# Patient Record
Sex: FEMALE | Race: WHITE | Employment: FULL TIME | URBAN - METROPOLITAN AREA
[De-identification: names, ages, dates, MRNs, and addresses within clinical notes are randomized per-mention and may not be internally consistent; named-entity substitution may affect disease eponyms.]

---

## 2017-08-15 ENCOUNTER — HOSPITAL ENCOUNTER (OUTPATIENT)
Dept: MAMMOGRAPHY | Age: 57
Discharge: HOME | End: 2017-08-15
Attending: FAMILY MEDICINE | Admitting: FAMILY MEDICINE

## 2017-08-15 DIAGNOSIS — Z12.31 VISIT FOR SCREENING MAMMOGRAM: ICD-10-CM

## 2018-08-17 ENCOUNTER — HOSPITAL ENCOUNTER (OUTPATIENT)
Dept: MAMMOGRAPHY | Age: 58
Discharge: HOME | End: 2018-08-22
Payer: COMMERCIAL

## 2018-08-17 DIAGNOSIS — Z12.31 VISIT FOR SCREENING MAMMOGRAM: ICD-10-CM

## 2018-08-17 PROCEDURE — 77067 SCR MAMMO BI INCL CAD: CPT

## 2019-08-21 ENCOUNTER — HOSPITAL ENCOUNTER (OUTPATIENT)
Dept: MAMMOGRAPHY | Age: 59
Discharge: HOME | End: 2019-08-26
Payer: COMMERCIAL

## 2019-08-21 DIAGNOSIS — Z12.31 VISIT FOR SCREENING MAMMOGRAM: ICD-10-CM

## 2019-09-11 ENCOUNTER — HOSPITAL ENCOUNTER (OUTPATIENT)
Dept: MAMMOGRAPHY | Age: 59
Discharge: HOME | End: 2019-09-16
Payer: COMMERCIAL

## 2019-09-11 DIAGNOSIS — Z12.31 VISIT FOR SCREENING MAMMOGRAM: ICD-10-CM

## 2019-09-11 PROCEDURE — 77067 SCR MAMMO BI INCL CAD: CPT

## 2020-09-24 ENCOUNTER — HOSPITAL ENCOUNTER (OUTPATIENT)
Dept: MAMMOGRAPHY | Age: 60
Discharge: HOME | End: 2020-09-29
Payer: COMMERCIAL

## 2020-09-24 PROCEDURE — 77063 BREAST TOMOSYNTHESIS BI: CPT

## 2021-11-20 NOTE — PROGRESS NOTES
[facility]  Pulmonary medicine clinic initial consult note. Patient: Tommy Freire  : 1960      Chief complaint/[place]: Tommy Freire is a 64 y.o. old female came for further evaluation regarding her COPD and shortness of breath with referral from Dr. Yocasta York MD     She is having shortness of breath:Yes  Onset: Gradual  Duration:{NUMBERS 0-12:07838}{DURATION:}. Diurnal variation:  None. Worse {Time; morning/afternoon/evenin::\"in the morning\"}  Functional status prior to beginning of symptoms: Distance she can walk on level ground: {NUMBERS; 100-1000 :41581} feet. Current functional capacity on level ground: Distance she can walk on level ground: {NUMBERS; 100-1000 :22897} feet. She can climb steps: {YES/NO:::\"No\"}  Flights of steps she can climb: {NUMBERS; 0-10:5044}  She gives a history of orthopnea:{YES/NO:::\"Yes\"}  She gives a history of paroxysmal nocturnal dyspnea:{YES/NO:::\"Yes\"}       She is having cough: {YES/NO:::\"Yes\"}  Duration of cough: for {NUMBERS 1-30:570050257} days. Her cough is associated with sputum production: {YES/NO:::\"Yes\"}   The sputum color: {COLOR:3322657963::\"clear\"}  Hemoptysis:{YES/NO:::\"No\"}  Diurnal variation: None. Worse {Time; morning/afternoon/evenin::\"in the morning\"}  Relieving factors: {YES/NO:::\"Yes\"}  Aggravating factors: {YES/NO:::\"No\"}    She is having chest pain:{YES/NO:::\"No\"}  Duration:Days:{NUMBERS 0-12:}  Onset:{DESC; ONSET:8}. Location:predominantly on {LOCATION:3441237754}  Nature/Quality:{PAIN QUALITY:}  Assessment:{PAIN ASSESSMENT:09500}. Radiation:{Pain radiation-gi:44680}  Scale:{pain scale:903739893}/10  Relation ship to breathing: {Increased/decreased/unchanged:54825} with inspiration.     She is currently using any oxygen supplementation at rest, exercise or during sleep/at night time: {YES/NO:19726::\"No\"}    She was ever diagnosed with following pulmonary diseases:  Asthma:NO  Pulmonary fibrosis:NO  Sarcoidosis:NO  Pulmonary embolism: NO   History of DVT in the past: NO    Pulmonary hypertension:NO  Pleural effusion/s in the past:NO    She ever diagnosed with connective tissue diseases including Systemic lupus Erythematosus, Rheumatoid arthritis etc:NO    She ever diagnosed with pulmonary tuberculosis in the past:NO  She ever recently exposed to patients with tuberculosis:NO  She ever recently travelled to endemic places of tuberculosis or out side [place]:NO  Her ever tested for PPD in the past: NO    She ever diagnosed with COVID-19 infection in the past:{YES/NO:19726}. Social History:  Occupation:  She is current working: {YES/NO:19726}  Type of profession: {Occupation:51801}. Social History     Tobacco Use    Smoking status: Not on file    Smokeless tobacco: Not on file   Substance Use Topics    Alcohol use: Not on file    Drug use: Not on file       History of recreational or IV drug use in the past:NO  History of Alcohol use: {YES/NO:19726}. History of exposure to coal mines/coal dust: NO  History of exposure to foundry dust/welding: NO  History of exposure to quarry/silica/sandblasting: NO  History of exposure to asbestos/working with breaks/ships: NO  History of exposure to farm dust: NO  History of recent travel to long distances: NO  History of exposure to birds, pigeons, or chickens in the past:NO  Pet animals at home:{YES/NO:19726}  Dogs: {NUMBERS 0-10:20300}  Cats: {NUMBERS 0-10:20300}    History of pulmonary embolism in the past: {YES/NO:19726}            History of DVT in the past:{YES/NO:19726}                             Review of Systems:   General/Constitutional: No recent loss of weight or appetite changes. No fever or chills. HENT: Negative. Eyes: Negative. Upper respiratory tract: No nasal stuffiness or post nasal drip.   Lower respiratory tract/ lungs: No cough or sputum production. No hemoptysis. Cardiovascular: No palpitations or chest pain. Gastrointestinal: No nausea or vomiting. Neurological: No focal neurologiacal weakness. Extremities: No edema. Musculoskeletal: No complaints. Genitourinary: No complaints. Hematological: Negative. Psychiatric/Behavioral: Negative. Skin: No itching. Current Medications:          No past medical history on file. No past surgical history on file. Not on File    No current outpatient medications on file. No current facility-administered medications for this visit. No family history on file. Physical Exam:    VITALS:  There were no vitals taken for this visit. Nursing note and vitals reviewed. Constitutional: Patient appears moderately built and moderately nourished. No distress. Patient is oriented to person, place, and time. HENT:   Head: Normocephalic and atraumatic. Right Ear: External ear normal.   Left Ear: External ear normal.   Mouth/Throat: Oropharynx is clear and moist.  No oral thrush. Eyes: Conjunctivae are normal. Pupils are equal, round, and reactive to light. No scleral icterus. Neck: Neck supple. No JVD present. No tracheal deviation present. Cardiovascular: Normal rate, regular rhythm, normal heart sounds. No murmur heard. Pulmonary/Chest: Effort normal and breath sounds normal. No stridor. No respiratory distress. No wheezes. No rales. Patient exhibits no tenderness. Abdominal: Soft. Patient exhibits no distension. No tenderness. Musculoskeletal: Normal range of motion. Extremities: Patient exhibits no edema and no tenderness. Lymphadenopathy:  No cervical adenopathy. Neurological: Patient is alert and oriented to person, place, and time. Skin: Skin is warm and dry. Patient is not diaphoretic. Psychiatric: Patient  has a normal mood and affect.  Patient behavior is normal.     Mallampati airway Class:{Doug # I-V:19725}  Neck Circumference:{NUMBERS 0-31:38487}. {NUMBERS; 6-0:65270} Inches    Diagnostic Data:    Radiological Data:      Pulmonary function tests: Performed on 4 September 2018                  Assessment:  -Mild/Moderate/Severe COPD. No past medical history on file. Recommendations/Plan:    - Will send Orbs-3-Dokzavkedsl swab today and to be followed at next clinic visit. - She was educated and demonstrated in my office how to use inhalers.   -José Medrano advised to continue prescribed inhalers and keep good compliance. - Patient educated to update her pneumococcal vaccine with family physician and take influenza vaccine in coming season with out fail.   - Schedule patient for Pulmonary rehab consult as soon as possible for pulmonary rehab therapy for her COPD.  - José Medrano educated about my impression and plan. She verbalizes understanding.   - Schedule patient for follow up with my clinic in 3 to 4months. She was advised to make early appointment if needed.  -She refused to go for low dose CT chest to screen for lung cancer at this time. She verbalizes understanding of consequences of her decision including delay in diagnosis leading to death.      -I personally reviewed updated the Past medical hx, Past surgical hx,Social hx, Family hx, Medications, Allergies in the discrete data section of the patient chart along with labs, Pulmonary medicine,Sleep medicine related, Pathological, Microbiological and Radiological investigations.

## 2021-12-17 ENCOUNTER — HOSPITAL ENCOUNTER (OUTPATIENT)
Dept: GENERAL RADIOLOGY | Age: 61
Discharge: HOME | End: 2021-12-17

## 2021-12-17 DIAGNOSIS — Z00.6 EXAMINATION FOR NORMAL COMPARISON FOR CLINICAL RESEARCH: ICD-10-CM

## 2021-12-20 ENCOUNTER — OFFICE VISIT (OUTPATIENT)
Dept: PULMONOLOGY | Age: 61
End: 2021-12-20
Payer: COMMERCIAL

## 2021-12-20 ENCOUNTER — TELEPHONE (OUTPATIENT)
Dept: PULMONOLOGY | Age: 61
End: 2021-12-20

## 2021-12-20 VITALS
DIASTOLIC BLOOD PRESSURE: 64 MMHG | OXYGEN SATURATION: 87 % | TEMPERATURE: 98.1 F | WEIGHT: 151.6 LBS | SYSTOLIC BLOOD PRESSURE: 106 MMHG | HEIGHT: 66 IN | BODY MASS INDEX: 24.36 KG/M2 | HEART RATE: 58 BPM

## 2021-12-20 DIAGNOSIS — J44.9 COPD WITHOUT EXACERBATION (HCC): Primary | ICD-10-CM

## 2021-12-20 DIAGNOSIS — J41.0 SIMPLE CHRONIC BRONCHITIS (HCC): ICD-10-CM

## 2021-12-20 DIAGNOSIS — J44.9 COPD, SEVERE (HCC): ICD-10-CM

## 2021-12-20 DIAGNOSIS — F17.210 SMOKING GREATER THAN 40 PACK YEARS: ICD-10-CM

## 2021-12-20 DIAGNOSIS — Z87.891 PERSONAL HISTORY OF TOBACCO USE, PRESENTING HAZARDS TO HEALTH: ICD-10-CM

## 2021-12-20 DIAGNOSIS — J44.9 COPD, SEVERE (HCC): Primary | ICD-10-CM

## 2021-12-20 PROCEDURE — 99204 OFFICE O/P NEW MOD 45 MIN: CPT | Performed by: INTERNAL MEDICINE

## 2021-12-20 RX ORDER — ALBUTEROL SULFATE 90 UG/1
2 AEROSOL, METERED RESPIRATORY (INHALATION) EVERY 6 HOURS PRN
Qty: 18 G | Refills: 11 | Status: SHIPPED | OUTPATIENT
Start: 2021-12-20 | End: 2022-12-20

## 2021-12-20 NOTE — PROGRESS NOTES
Neck Circumference -   13.5  Mallampati - 4    Lung Nodule Screening     [] Qualifies    [] Does not qualify   [] Declined    [] Completed

## 2021-12-20 NOTE — PROGRESS NOTES
[facility]  Pulmonary medicine clinic initial consult note. Patient: Joshua Samaniego  : 1960      Chief complaint/[place] [facility]: Joshua Samaniego is a 64 y. o.oldfemale came for further evaluation regarding her with referral from Dr. Chandrika Rodriguez MD. H/o COPD GOLD 3 FEV1 40ppv in 2018, only on albuterol rescue. No other significant past medical history in chart or per patient. Pt uses her albuterol rescue every morning and night and sometimes at work after walking at work. 2L NC intermittently but not necessarily with exertion. Pt has chronic cough which is productive of intermittently yellow phlegm (1 teaspoon). Intermittently feverish with chills. 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 History of tobacco smoking:Yes. Amount of tobacco smokin.0 PPD. Years of tobacco smokin. Quit smoking: Yes. .              Quit year:   Current smoker: No.       Amount of current tobacco smokin PPD    History of recreational or IV drug use in the past:NO     History of exposure to coal mines/coal dust: NO  History of exposure to foundry dust/welding: NO  History of exposure to quarry/silica/sandblasting: NO  History of exposure to asbestos/working with breaks/ships: NO  History of exposure to farm dust: NO  History of recent travel to long distances: NO  History of exposure to birds, pigeons, or chickens in the past:NO  Pet animals at home:Yes  Dogs: 2  Cats: 0    History of pulmonary embolism in the past: No            History of DVT in the past:No                             Review of Systems: negative except for the aforementioned    Current Medications:          No past medical history on file. No past surgical history on file.     No Known Allergies    Current Outpatient Medications   Medication Sig Dispense Refill    UNABLE TO FIND Patient states she is on something for depression but can't remember the name.  ALBUTEROL IN Inhale into the lungs       No current facility-administered medications for this visit. No family history on file. Physical Exam:    VITALS:  /64 (Site: Left Upper Arm, Position: Sitting, Cuff Size: Medium Adult)   Pulse 58   Temp 98.1 °F (36.7 °C)   Ht 5' 6\" (1.676 m)   Wt 151 lb 9.6 oz (68.8 kg)   SpO2 (!) 87% Comment: room air at rest  BMI 24.47 kg/m²     General/Constitutional: no acute distress. Was anxious as she is claustrophobic and patient room is small. HEENT: NCAT. PERRL, EOMI. Clear oropharyngeal cavity w/o erythema or exudate  Upper respiratory tract: no upper airway referred noises, stridor, or masses noted on palpation of neck. Lower respiratory tract/ lungs: Significant polyphonic wheezes heard over bilateral lung fields  Cardiovascular: RRR. No M/R/G. S1 and S2 normal. No LE edema  Gastrointestinal: soft, non-tender, and bowel sounds normoactive. Neurological: AOx4. No FND. MSK/Extremities: ambulating w/o difficulty. Can move upper extremities without difficulty. Hematological: No bruises noted over visible areas of body  Psychiatric/Behavioral: judgement and insight are intact  Skin: warm, dry, intact, no lesions, no erythema, no exudate    Mallampati airway Class:IV  Neck Circumference:13.5 Inches    Diagnostic Data:    Radiological Data:            07/06/2021    No impression from radiologist. Pt has multiple nodules located bilaterally primarily over the middle and lower lobes. Hardware noted. Chest Xray done in 2018            Pulmonary function tests: 2018        Assessment:  -COPD GOLD 3 FEV1 40ppv 09/04/2018  Trigger: cold air. Initial sxs: increased dyspnea, cough, wheezing. Exacerbation Hx: >/=2x. Smoker no, quit in 2019, 40pyh. A1AT: Never screened. Pneumonia vaccine: yes - per patient.  Home meds: Albuterol MDI: yes but temporary. Baseline O2 2L NC.  -Chronic Simple Bronchitis  -40 pack year history of smoking, quit in 2019    Recommendations/Plan:  - Will send Dpig-6-Hivwwuyjzes swab today and to be followed at next clinic visit. - Will prescribe 1-year supply of Stiolto maintenance inhaler; 1-year supply of albuterol inhaler already sent by primary care physician  - Will schedule LD CT Chest for recommended screening given pt's smoking history; patient was agreeable to this. - She was educated and demonstrated in my office how to use inhalers.   -Smith Janette advised to continue prescribed inhalers and keep good compliance. - Patient educated to update her pneumococcal vaccine with family physician and take influenza vaccine in coming season with out fail.   - Schedule patient for Pulmonary rehab consult as soon as possible for pulmonary rehab therapy for her COPD.  - Luis Dawsonsteve educated about my impression and plan. She verbalizes understanding.   - Schedule patient for follow up with Dr. Mayra Ascencio MD in 1 month for review of LD CT Chest Scan. She was advised to make early appointment if needed. Addendum by Dr. Mayra Ascencio MD:  I have seen and examined the patient independently. Face to face evaluation and examination was performed. The above evaluation and note has been reviewed. Labs and radiographs were reviewed. I have discussed with Dr. Vishnu Elise MD about this patient in detail. The above assessment and plan has been reviewed. Please see my modifications mentioned below. My modifications:  Pulmonary function tests: Performed on 4 September 2018              She denies any history of Glucoma or urinary retention in the past    Assessment:  -Severe COPD- not under control.  -Chronic use of tobacco smoking.   She quit smoking  -Abnormal chest x-ray with multiple subcentimeter lung nodules stable as per the last chest x-ray.  -Chronic hypoxic respiratory failure on LT OT    Recommendations/Plan:  -Start patient on Stiolto Respimat ( Tiotropium bromide and Olodaterol) 2.5mcg/2.5mcg per actuation 2 puffs once daily. She was detailed about mechanism of action of drug along with associated side effects. She agreed to take the risk and medication. She verbalizes understanding. Patient educated and demonstrated in my office how to use above inhaler. Patient verbalizes understanding.  -Continue patient on Albuterol HFA 90mcg/Spray MDI, 2puffs  Q6Hprn. She  was informed about adverse effects of Albuterol HFA. She verbalizes understanding.  - Will send Gzic-1-Dpzzacbproz swab today and to be followed at next clinic visit. - She was educated and demonstrated in my office how to use inhalers.   -Maciel Cisneros advised to continue prescribed inhalers and keep good compliance. - Patient educated to update her pneumococcal vaccine with family physician and take influenza vaccine in coming season with out fail.   -Schedule patient for Pulmonary rehab consult as soon as possible for pulmonary rehab therapy for her COPD. -Schedule patient for low dose CT scan of chest with out IV contrast as recommended by the  U.S. Preventive Services Task Force and the NCCN for lung Cancer Screening as soon as possible.   -Maciel Cisneros was advised and instructed to come for follow up with my clinic in 1month I.e 1 to 2 weeks after having his low dose CT chest to go over the above test results and management. Giorgi Nguyen and her  were educated about my impression and plan. They verbalizes understanding.       -I personally reviewed updated the Past medical hx, Past surgical hx,Social hx, Family hx, Medications, Allergies in the discrete data section of the patient chart along with labs, Pulmonary medicine,Sleep medicine related, Pathological, Microbiological and Radiological investigations.        Nic Olmstead MD 12/20/2021 1:24 PM

## 2021-12-20 NOTE — PATIENT INSTRUCTIONS
Recommendations/Plan:  -Start patient on Stiolto Respimat ( Tiotropium bromide and Olodaterol) 2.5mcg/2.5mcg per actuation 2 puffs once daily. She was detailed about mechanism of action of drug along with associated side effects. She agreed to take the risk and medication. She verbalizes understanding. Patient educated and demonstrated in my office how to use above inhaler. Patient verbalizes understanding.  -Continue patient on Albuterol HFA 90mcg/Spray MDI, 2puffs  Q6Hprn. She  was informed about adverse effects of Albuterol HFA. She verbalizes understanding.  - Will send Vfub-1-Nwatjnrkncz swab today and to be followed at next clinic visit. - She was educated and demonstrated in my office how to use inhalers.   -Gio Marroquin advised to continue prescribed inhalers and keep good compliance. - Patient educated to update her pneumococcal vaccine with family physician and take influenza vaccine in coming season with out fail.   -Schedule patient for Pulmonary rehab consult as soon as possible for pulmonary rehab therapy for her COPD. -Schedule patient for low dose CT scan of chest with out IV contrast as recommended by the  U.S. Preventive Services Task Force and the NCCN for lung Cancer Screening as soon as possible.   -Gio Fideljoseph was advised and instructed to come for follow up with my clinic in 1month I.e 1 to 2 weeks after having his low dose CT chest to go over the above test results and management. Jose Laureano and her  were educated about my impression and plan. They verbalizes understanding.

## 2021-12-21 DIAGNOSIS — J44.9 COPD, SEVERE (HCC): ICD-10-CM

## 2022-01-01 NOTE — PROGRESS NOTES
[facility]  Pulmonary medicine clinic follow up note. Patient: Carlton Gross  : 1960      Chief complaint/[place]: Carlton Gross is a 64 y. o.oldfemale came for follow-up regarding her severe COPD after having recommended a low-dose CT scan of chest without IV contrast.     She was recommended to have a pulmonary rehab therapy for her severe COPD. Patient however not able to add in her pulmonary rehab therapy. Patient told me that she still working full-time and do not have time to attend her pulmonary rehab therapy. She was prescribed with 2 L of oxygen by her family physician for her COPD approximately 3 to 4 months back. Patient currently not using her home oxygen as prescribed. She want to be evaluated for home oxygen therapy to consider for discontinuation of home oxygen therapy. She was initially referred by Dr. Sidney Rodriguez MD.     On today's questioning:  She denies cough or expectoration. She denies hemoptysis. She denies fever or chills. She denies recent hospitalizations or emergency room visits. She is using her prescribed inhalers with good compliance. She is using rescue inhaler/nebs rarely. She denies recent loss of weight or appetite changes. She denies recent decline in functional status. She is currently on following inhaler therapy:  -Stiolto Respimat ( Tiotropium bromide and Olodaterol) 2.5mcg/2.5mcg per actuation 2 puffs once daily.  -Albuterol HFA 90mcg/Spray MDI, 2puffs  Q6Hprn. 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 History of tobacco smoking:Yes. Amount of tobacco smokin.0 PPD. Years of tobacco smokin. Quit smoking: Yes.   .              Quit year:   Current smoker: No.       Amount of current tobacco smokin PPD    History of recreational or IV drug use in the past:NO     History of exposure to coal mines/coal dust: NO  History of exposure to foundry dust/welding: NO  History of exposure to quarry/silica/sandblasting: NO  History of exposure to asbestos/working with breaks/ships: NO  History of exposure to farm dust: NO  History of recent travel to long distances: NO  History of exposure to birds, pigeons, or chickens in the past:NO  Pet animals at home:Yes  Dogs: 2  Cats: 0    History of pulmonary embolism in the past: No            History of DVT in the past:No                             Review of Systems:   General/Constitutional: she gained 1lbs of weight from the last visit. No fever or chills. HENT: Negative. Eyes: Negative. Upper respiratory tract: No nasal stuffiness or post nasal drip. Lower respiratory tract/ lungs: No cough or sputum production. Cardiovascular: No palpitations or chest pain. Gastrointestinal: No nausea or vomiting. Neurological: No focal neurologiacal weakness. Extremities: No edema. Musculoskeletal: No complaints. Genitourinary: No complaints. Hematological: Negative. Psychiatric/Behavioral: Negative. Skin: No itching. Current Medications:        No past medical history on file. No past surgical history on file. No Known Allergies    Current Outpatient Medications   Medication Sig Dispense Refill    UNABLE TO FIND Patient states she is on something for depression but can't remember the name.  tiotropium-olodaterol (STIOLTO RESPIMAT) 2.5-2.5 MCG/ACT AERS Inhale 2 puffs into the lungs daily 1 each 11    albuterol sulfate  (90 Base) MCG/ACT inhaler Inhale 2 puffs into the lungs every 6 hours as needed for Wheezing or Shortness of Breath 18 g 11     No current facility-administered medications for this visit. No family history on file.         Physical Exam:    VITALS:  /70 (Site: Left Upper Arm, Position: Sitting, Cuff Size: Medium me.      Zddk-5-TjppOxwuaem result:       Not done. Six Minute Walk Test done on 1/27/22 at 8:54 AM EST    Shaniqua Varghese 1960    She needs no home O2 at rest. She needs 3 LPM of home O2 with exercise. She will be evaluated for nocturnal home O2 requirement- see separte order. Please see scanned six minute walk test document in media for details with above date. DME Medical Necessity Documentation    Patient was seen in my clinic on 1/27/22 for the diagnosis COPD. I am prescribing oxygen because the diagnosis and testing requires the patient to have oxygen in the home. Condition will improve or be benefited by oxygen use. The patient is  able to perform good mobility in a home setting and therefore does require the use of a portable oxygen system. Assessment:  -Severe COPD- under control with the current inhaler therapy  -Chronic use of tobacco smoking. She quit smoking 2019  -Multiple calcified subcentimeter lung nodules-most likely due to previous granulomatous lung disease. \  -6 mm right lower lobe superior segment nodule noted on her CT scan of chest dated 20 January 2022. Need follow-up as per the radiologist recommendation.  -Chronic hypoxic respiratory failure on LT OT  -History of scoliosis. Patient underwent surgery with subsequent metal rods placement in 1981.     Recommendations/Plan:  -Continue Stiolto Respimat ( Tiotropium bromide and Olodaterol) 2.5mcg/2.5mcg per actuation 2 puffs once daily.  -Continue patient on Albuterol HFA 90mcg/Spray MDI, 2puffs  Q6Hprn.   -Will resend Kxpr-3-Azuoisgybdx swab today and to be followed at next clinic visit.   -She was educated and demonstrated in my office how to use inhalers.   -Shaniqua Varghese advised to continue prescribed inhalers and keep good compliance.  -Patient educated to update her pneumococcal vaccine with family physician and take influenza vaccine in coming season with out fail.   -Shaniqua Varghese needs no home O2 at rest. She needs 3LPM of home O2 with exercise. She will be evaluated for nocturnal home O2 requirement- see separte order.  -Nocturnal pulse ox study on room air to check for the continuation/discontinuation of patient current home O2 at night time.  -Schedule patient for CT scan of chest with out IV contrast in 6months to follow her 6 mm right lower lobe superior segment nodule noted on her low-dose CT scan of chest dated 20 January 2022.  -Dave Townsend was advised to make early appointment with my clinic if she develops any constitutional symptoms including loss of weight, poor appetite or hemoptysis. She verbalizes under standing.  - Schedule patient for follow up with my clinic in 6 months with recommended test I.e CT chest with out IV contrast. Patient advised to make early appointment if needed. Kristin Regan and her  were educated about my impression and plan. They verbalizes understanding.     -She was offered a Pulmonary rehab consult for pulmonary rehab therapy for her  COPD. How ever at the end of discussion she refused and verbalizes understanding of consequences of her decision. She also told me that she works 10 hours/day every day. She do not have any time to attend pulmonary rehab therapy  -I personally reviewed updated the Past medical hx, Past surgical hx,Social hx, Family hx, Medications, Allergies in the discrete data section of the patient chart along with labs, Pulmonary medicine,Sleep medicine related, Pathological, Microbiological and Radiological investigations. Addendum done on 2/1/22 at 3:43 PM EST:          Plan:  -Dave Townsend needs no home O2 at rest. She needs 3LPM of home O2 with exercise and 2LPM of home O2 at night time.

## 2022-01-20 ENCOUNTER — TELEPHONE (OUTPATIENT)
Dept: PULMONOLOGY | Age: 62
End: 2022-01-20

## 2022-01-20 NOTE — TELEPHONE ENCOUNTER
Received call from varun HAN [facility] dept, asking about CPT code for pt CT Lung screening she is says that the right code for the screening is [id number], code on order is [phone], she reported that this code is for CT Chest. Please advise.

## 2022-01-20 NOTE — TELEPHONE ENCOUNTER
Please request her to change to new CPT code if she can. I placed a new request in Epic dated today. I contacted CT scan dept at [facility]. The image code for low dose CT is [id number]. I also wrote an order for low dose CT chest with out IV contrast on a prescription. Please fax to the [facility] dept. Unfortunately no other options available in Epic.     However I never had any problem even at [facility] with my low dose CT chest requests in the past.

## 2022-01-21 DIAGNOSIS — Z87.891 PERSONAL HISTORY OF TOBACCO USE, PRESENTING HAZARDS TO HEALTH: ICD-10-CM

## 2022-01-26 DIAGNOSIS — J44.9 COPD, SEVERE (HCC): ICD-10-CM

## 2022-01-27 ENCOUNTER — OFFICE VISIT (OUTPATIENT)
Dept: PULMONOLOGY | Age: 62
End: 2022-01-27
Payer: COMMERCIAL

## 2022-01-27 VITALS
BODY MASS INDEX: 24.46 KG/M2 | HEART RATE: 69 BPM | OXYGEN SATURATION: 93 % | DIASTOLIC BLOOD PRESSURE: 70 MMHG | HEIGHT: 66 IN | WEIGHT: 152.2 LBS | SYSTOLIC BLOOD PRESSURE: 112 MMHG | TEMPERATURE: 98.9 F

## 2022-01-27 DIAGNOSIS — R91.1 INCIDENTAL LUNG NODULE, > 3MM AND < 8MM: ICD-10-CM

## 2022-01-27 DIAGNOSIS — J44.9 COPD, SEVERE (HCC): ICD-10-CM

## 2022-01-27 DIAGNOSIS — J44.9 COPD, SEVERE (HCC): Primary | ICD-10-CM

## 2022-01-27 DIAGNOSIS — Z87.891 PERSONAL HISTORY OF TOBACCO USE, PRESENTING HAZARDS TO HEALTH: ICD-10-CM

## 2022-01-27 DIAGNOSIS — F17.210 SMOKING GREATER THAN 40 PACK YEARS: ICD-10-CM

## 2022-01-27 PROCEDURE — 99214 OFFICE O/P EST MOD 30 MIN: CPT | Performed by: INTERNAL MEDICINE

## 2022-01-27 NOTE — PATIENT INSTRUCTIONS
Recommendations/Plan:  -Continue Stiolto Respimat ( Tiotropium bromide and Olodaterol) 2.5mcg/2.5mcg per actuation 2 puffs once daily.  -Continue patient on Albuterol HFA 90mcg/Spray MDI, 2puffs  Q6Hprn.   -Will resend Lxet-4-[id number] swab today and to be followed at next clinic visit.   -She was educated and demonstrated in my office how to use inhalers.   -Ran Dwakins advised to continue prescribed inhalers and keep good compliance.  -Patient educated to update her pneumococcal vaccine with family physician and take influenza vaccine in coming season with out fail.   -Ran Dawkins needs no home O2 at rest. She needs 3LPM of home O2 with exercise. She will be evaluated for nocturnal home O2 requirement- see separte order.  -Nocturnal pulse ox study on room air to check for the continuation/discontinuation of patient current home O2 at night time.  -Schedule patient for CT scan of chest with out IV contrast in 6months to follow her 6 mm right lower lobe superior segment nodule noted on her low-dose CT scan of chest dated 20 January 2022.  -Ran Dawkins was advised to make early appointment with my clinic if she develops any constitutional symptoms including loss of weight, poor appetite or hemoptysis. She verbalizes under standing.  - Schedule patient for follow up with my clinic in 6 months with recommended test I.e CT chest with out IV contrast. Patient advised to make early appointment if needed. Vel Connell and her  were educated about my impression and plan. They verbalizes understanding.

## 2022-01-27 NOTE — PROGRESS NOTES
Neck Circumference -   13.5  Mallampati - 4    Lung Nodule Screening     [] Qualifies    [] Does not qualify   [] Declined    [] Completed  Ct chest 1/20/22

## 2022-02-01 DIAGNOSIS — J44.9 COPD, SEVERE (HCC): ICD-10-CM

## 2022-02-01 DIAGNOSIS — F17.210 SMOKING GREATER THAN 40 PACK YEARS: ICD-10-CM

## 2022-02-01 DIAGNOSIS — R91.1 INCIDENTAL LUNG NODULE, > 3MM AND < 8MM: ICD-10-CM

## 2022-02-01 DIAGNOSIS — Z87.891 PERSONAL HISTORY OF TOBACCO USE, PRESENTING HAZARDS TO HEALTH: ICD-10-CM

## 2022-02-01 PROCEDURE — 94618 PULMONARY STRESS TESTING: CPT | Performed by: INTERNAL MEDICINE

## 2022-02-11 ENCOUNTER — TELEPHONE (OUTPATIENT)
Dept: PULMONOLOGY | Age: 62
End: 2022-02-11

## 2022-02-11 NOTE — TELEPHONE ENCOUNTER
Pts son Norman(He is on HIPPA) called concerning his mothers need for oxygen and disability. She works 10 hours days and is not able to use the O2 at work. He just wondered if disability is something she should be applying for? Please advise.

## 2022-02-11 NOTE — TELEPHONE ENCOUNTER
Patient and patient's son need to contact the patient's employer human resources service department to apply for disability. They will get my records. They can also contact a disability physician in [address] [place] [address] to go for disability. Please inform the patient and patient's son that, I am not a disability physician and I do not do disabilities. I will provide patient's records to her disability company after obtaining patient's consent for release of medical information.

## 2022-02-15 NOTE — TELEPHONE ENCOUNTER
Called and informed Pts son. I suggested they speak to HR and file at [facility].

## 2022-02-18 ENCOUNTER — TELEPHONE (OUTPATIENT)
Dept: PULMONOLOGY | Age: 62
End: 2022-02-18

## 2022-03-14 ENCOUNTER — TELEPHONE (OUTPATIENT)
Dept: PULMONOLOGY | Age: 62
End: 2022-03-14

## 2022-03-14 DIAGNOSIS — J44.9 COPD, SEVERE (HCC): Primary | ICD-10-CM

## 2022-03-14 NOTE — TELEPHONE ENCOUNTER
Pts  called and Pts employer is requiring her to have an O2 system that does not use the tanks while working. They are trying to get an inogene  They need an order for this please. He will call me back with where he wants order sent to.

## 2022-03-14 NOTE — TELEPHONE ENCOUNTER
I placed an order for portable oxygen concentrator prescription in [facility]. Please fax the prescription to her DME company. Please inform patient and patient's  regarding this change.

## 2022-03-15 NOTE — TELEPHONE ENCOUNTER
[employer] and he would like this faxed to [employer] [place]. They will not give price of Inogene until they receive the order.

## 2022-06-28 NOTE — PROGRESS NOTES
[facility]  Pulmonary medicine clinic follow up note. Patient: Joshua Samaniego  : 1960      Chief complaint/[place]: Joshua Samaniego is a 58 y. o.oldfemale came for follow-up regarding her severe COPD. She is supposed to have repeat CT scan of chest without IV contrast to follow her lung nodule as recommended by radiologist who dictated the report. However, her medical insurance denied repeat CT scan in 6 months. Patient did not meet the criteria for medical insurance to have a repeat CT scan of chest.  Her case was closed. She was recommended to have a pulmonary rehab therapy for her severe COPD. Patient however not able to add in her pulmonary rehab therapy. Patient told me that she still working full-time and do not have time to attend her pulmonary rehab therapy. She was initially referred by Dr. Chandrika Rodriguez MD.     On today's questioning:  She admits to cough with white expectoration. She denies hemoptysis. She denies fever or chills. She denies recent hospitalizations or emergency room visits. She is using her prescribed inhalers with excellent compliance. She is using her rescue inhaler frequently    She denies recent loss of weight or appetite changes. She denies recent decline in functional status. She is currently on following inhaler therapy:  -Stiolto Respimat ( Tiotropium bromide and Olodaterol) 2.5mcg/2.5mcg per actuation 2 puffs once daily.  -Albuterol HFA 90mcg/Spray MDI, 2puffs  Q6Hprn. 3LPM of home O2 with exercise and 2LPM of home O2 at night time. 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 History of tobacco smoking:Yes. Amount of tobacco smokin.0 PPD. Years of tobacco smokin. lungs every 6 hours as needed for Wheezing or Shortness of Breath 18 g 11     No current facility-administered medications for this visit. No family history on file. Physical Exam:    VITALS:  /62 (Site: Left Upper Arm, Position: Sitting, Cuff Size: Medium Adult)   Pulse 84   Temp 97.6 °F (36.4 °C)   Ht 5' 6\" (1.676 m)   Wt 149 lb (67.6 kg)   SpO2 92% Comment: room air at rest  BMI 24.05 kg/m²     Nursing note and vitals reviewed. Constitutional: Patient appears moderately built and moderately nourished. No distress. Patient is oriented to person, place, and time. HENT:   Head: Normocephalic and atraumatic. Right Ear: External ear normal.   Left Ear: External ear normal.   Mouth/Throat: Oropharynx is clear and moist.  No oral thrush. Eyes: Conjunctivae are normal. Pupils are equal, round, and reactive to light. No scleral icterus. Neck: Neck supple. No JVD present. No tracheal deviation present. Cardiovascular: Normal rate, regular rhythm, normal heart sounds. No murmur heard. Pulmonary/Chest: Effort normal and breath sounds normal. No stridor. No respiratory distress. Bilateral expiratory wheezes. No rales. Patient exhibits no tenderness. Abdominal: Soft. Patient exhibits no distension. No tenderness. Musculoskeletal: Normal range of motion. Extremities: Patient exhibits no edema and no tenderness. Lymphadenopathy:  No cervical adenopathy. Neurological: Patient is alert and oriented to person, place, and time. Skin: Skin is warm and dry. Patient is not diaphoretic. Psychiatric: Patient  has a normal mood and affect. Patient behavior is normal.       Neck Circumference -   13.5  Mallampati - 4    Diagnostic Data:    Radiological Data: Chest x-ray performed on 6 July 2021        Chest Xray done in 2018  The above test result reviewed.       Pulmonary function tests: Performed on 4 September 2018      She denies any history of Glucoma or urinary retention in the past.      Low dose CT Chest with out contrast for Lung Nodule Screenin22        The above radiological study films were reviewed by me. Six Minute Walk Test done on 22 at 8:54 AM VIVIAN [place] 1960    She needs no home O2 at rest. She needs 3 LPM of home O2 with exercise. She will be evaluated for nocturnal home O2 requirement- see separte order. Please see scanned six minute walk test document in media for details with above date. DME Medical Necessity Documentation    Patient was seen in my clinic on 22 for the diagnosis COPD. I am prescribing oxygen because the diagnosis and testing requires the patient to have oxygen in the home. Condition will improve or be benefited by oxygen use. The patient is  able to perform good mobility in a home setting and therefore does require the use of a portable oxygen system. Nocturnal pulse oximetry study performed on 2022 on room air:            CT chest with out contrast: Her medical insurance did not approve the CT scan of chest.  She did not meet the criteria for her medical insurance M/M    [id number] result:   Performed on 2022:  Normal pattern: M/M  Omer    Assessment:  -Severe COPD-Not under control with the current inhaler therapy. She would like to go with Trelegy treatment.  -Chronic use of tobacco smoking. She quit smoking 2019  -Multiple calcified subcentimeter lung nodules-most likely due to previous granulomatous lung disease.  -6 mm right lower lobe superior segment nodule noted on her CT scan of chest dated 2022. Need follow-up as per the radiologist recommendation.  -Chronic hypoxic respiratory failure on LT OT I.e 3LPM of home O2 with exercise and 2LPM of home O2 at night time  -History of scoliosis. Patient underwent surgery with subsequent metal rods placement in .     Recommendations/Plan:  -Will start patient onTrelegy Ellipta 100mcg/ 62.5mcg/25mcg per puff, 1puff daily in am. Garth Pruitt and demonstrated in my office how to use Trelegy Ellipta. She verbalizes understanding. She was detailed about mechanism of action of drug along with associated side effects. She agreed to take the risk and medication. She verbalizes understanding.  -Stop Stiolto Respimat ( Tiotropium bromide and Olodaterol) 2.5mcg/2.5mcg per actuation 2 puffs once daily.  -Continue patient on Albuterol HFA 90mcg/Spray MDI, 2puffs  Q6Hprn.   -She was educated and demonstrated in my office how to use inhalers.   -Miquel Frias advised to continue prescribed inhalers and keep good compliance.  -Patient educated to update her pneumococcal vaccine with family physician and take influenza vaccine in coming season with out fail.   -Miquel Frias needs no home O2 at rest. She needs 3LPM of home O2 with exercise and 2LPM of home O2 at night time.  -Schedule patient for low dose CT scan of chest with out IV contrast as recommended by the  U.S. Preventive Services Task Force and the NCCN for lung Cancer Screening and to follow 6 mm right lower lobe superior segment nodule noted on her low-dose CT scan of chest dated 20 January 2022 in January 2023. - Schedule patient for follow up with my clinic in January 2023with recommended test I.e low-dose CT chest with out IV contrast and spirometry before clinic visit. Patient advised to make early appointment if needed. - Miquel Frias educated about my impression and plan. She verbalizes understanding.     -She was offered a Pulmonary rehab consult for pulmonary rehab therapy for her  COPD. How ever at the end of discussion she refused and verbalizes understanding of consequences of her decision. She also told me that she works 10 hours/day every day.   She do not have any time to attend pulmonary rehab therapy    -I personally reviewed updated the Past medical hx, Past surgical hx,Social hx, Family hx, Medications, Allergies in the discrete data section of the patient chart along with labs, Pulmonary medicine,Sleep medicine related, Pathological, Microbiological and Radiological investigations. Low Dose CT (LDCT) Lung Screening criteria met:     Age 50-77(Medicare) or 50-80 ([facility])   Pack year smoking >20   Still smoking or less than 15 year since quit   No sign or symptoms of lung cancer   > 11 months since last LDCT     Risks and benefits of lung cancer screening with LDCT scans discussed:    Significance of positive screen - False-positive LDCT results often occur. 95% of all positive results do not lead to a diagnosis of cancer. Usually further imaging can resolve most false-positive results; however, some patients may require invasive procedures. Over diagnosis risk - 10% to 12% of screen-detected lung cancer cases are over diagnosed--that is, the cancer would not have been detected in the patient's lifetime without the screening. Need for follow up screens annually to continue lung cancer screening effectiveness     Risks associated with radiation from annual LDCT- Radiation exposure is about the same as for a mammogram, which is about 1/3 of the annual background radiation exposure from everyday life. Starting screening at age 54 is not likely to increase cancer risk from radiation exposure. Patients with comorbidities resulting in life expectancy of < 10 years, or that would preclude treatment of an abnormality identified on CT, should not be screened due to lack of benefit.     To obtain maximal benefit from this screening, smoking cessation and long-term abstinence from smoking is critical

## 2022-06-30 ENCOUNTER — TELEPHONE (OUTPATIENT)
Dept: PULMONOLOGY | Age: 62
End: 2022-06-30

## 2022-06-30 NOTE — TELEPHONE ENCOUNTER
Tiffany for the additional work. Please fax my last note along with CT chest report from January, 2022 regarding 6mm lung nodule follow up and radiologist recommendation. Thanks for your help. James.

## 2022-06-30 NOTE — TELEPHONE ENCOUNTER
The CT chest was ordered to follow her lung nodule noted on her previous CT chest.   She needs prior authorization or Peer to peer discussion to get it approved. Ordering CXR is not going to help her. Please obtain case number and phone number to do Peer to Peer discussion to get CT chest approved.

## 2022-07-28 ENCOUNTER — OFFICE VISIT (OUTPATIENT)
Dept: PULMONOLOGY | Age: 62
End: 2022-07-28
Payer: COMMERCIAL

## 2022-07-28 VITALS
OXYGEN SATURATION: 92 % | DIASTOLIC BLOOD PRESSURE: 62 MMHG | HEART RATE: 84 BPM | BODY MASS INDEX: 23.95 KG/M2 | TEMPERATURE: 97.6 F | SYSTOLIC BLOOD PRESSURE: 122 MMHG | WEIGHT: 149 LBS | HEIGHT: 66 IN

## 2022-07-28 DIAGNOSIS — J44.9 STAGE 3 SEVERE COPD BY GOLD CLASSIFICATION (HCC): ICD-10-CM

## 2022-07-28 DIAGNOSIS — Z87.891 PERSONAL HISTORY OF TOBACCO USE: ICD-10-CM

## 2022-07-28 DIAGNOSIS — Z87.891 PERSONAL HISTORY OF TOBACCO USE, PRESENTING HAZARDS TO HEALTH: Primary | ICD-10-CM

## 2022-07-28 PROCEDURE — G0296 VISIT TO DETERM LDCT ELIG: HCPCS | Performed by: INTERNAL MEDICINE

## 2022-07-28 PROCEDURE — 99214 OFFICE O/P EST MOD 30 MIN: CPT | Performed by: INTERNAL MEDICINE

## 2022-07-28 RX ORDER — BUPROPION HYDROCHLORIDE 150 MG/1
150 TABLET, EXTENDED RELEASE ORAL 2 TIMES DAILY
COMMUNITY

## 2022-07-28 RX ORDER — FLUTICASONE FUROATE, UMECLIDINIUM BROMIDE AND VILANTEROL TRIFENATATE 100; 62.5; 25 UG/1; UG/1; UG/1
1 POWDER RESPIRATORY (INHALATION) DAILY
Qty: 30 EACH | Refills: 11 | Status: SHIPPED | OUTPATIENT
Start: 2022-07-28 | End: 2023-07-28

## 2022-07-28 RX ORDER — ESCITALOPRAM OXALATE 20 MG/1
20 TABLET ORAL DAILY
COMMUNITY

## 2022-07-28 NOTE — PATIENT INSTRUCTIONS
Recommendations/Plan:  -Will start patient onTrelegy Ellipta 100mcg/ 62.5mcg/25mcg per puff, 1puff daily in am. Leif Bentley and demonstrated in my office how to use Trelegy Ellipta. She verbalizes understanding. She was detailed about mechanism of action of drug along with associated side effects. She agreed to take the risk and medication. She verbalizes understanding.  -Stop Stiolto Respimat ( Tiotropium bromide and Olodaterol) 2.5mcg/2.5mcg per actuation 2 puffs once daily.  -Continue patient on Albuterol HFA 90mcg/Spray MDI, 2puffs  Q6Hprn.   -She was educated and demonstrated in my office how to use inhalers.   -Hazel Boyle advised to continue prescribed inhalers and keep good compliance.  -Patient educated to update her pneumococcal vaccine with family physician and take influenza vaccine in coming season with out fail.   -Hazel Boyle needs no home O2 at rest. She needs 3LPM of home O2 with exercise and 2LPM of home O2 at night time.  -Schedule patient for low dose CT scan of chest with out IV contrast as recommended by the  U.S. Preventive Services Task Force and the NCCN for lung Cancer Screening and to follow 6 mm right lower lobe superior segment nodule noted on her low-dose CT scan of chest dated 20 January 2022 in January 2023. - Schedule patient for follow up with my clinic in January 2023with recommended test I.e low-dose CT chest with out IV contrast and spirometry before clinic visit. Patient advised to make early appointment if needed. - Hazel Boyle educated about my impression and plan. She verbalizes understanding. What is lung cancer screening? Lung cancer screening is a way in which doctors check the lungs for early signs of cancer in people who have no symptoms of lung cancer. A low-dose CT scan uses much less radiation than a normal CT scan and shows a more detailed image of the lungs than a standard X-ray.   The goal of lung cancer screening is to find cancer early, before it has a chance to grow, spread, or cause problems. One large study found that smokers who were screened with low-dose CT scans were less likely to die of lung cancer than those who were screened with standard X-ray. Below is a summary of the things you need to know regarding screening for lung cancer with low-dose computed tomography (LDCT). This is a screening program that involves routine annual screening with LDCT studies of the lung. The LDCTs are done using low-dose radiation that is not thought to increase your cancer risk. If you have other serious medical conditions (other cancers, congestive heart failure) that limit your life expectancy to less than 10 years, you should not undergo lung cancer screening with LDCT. The chance is 20%-60% that the LDCT result will show abnormalities. This would require additional testing which could include repeat imaging or even invasive procedures. Most (about 95%) of \"abnormal\" LDCT results are false in the sense that no lung cancer is ultimately found. Additionally, some (about 10%) of the cancers found would not affect your life expectancy, even if undetected and untreated. If you are still smoking, the single most important thing that you can do to reduce your risk of dying of lung cancer is to quit. For this screening to be covered by Medicare and most other insurers, strict criteria must be met. If you do not meet these criteria, but still wish to undergo LDCT testing, you will be required to sign a waiver indicating your willingness to pay for the scan.

## 2022-07-29 ENCOUNTER — TELEPHONE (OUTPATIENT)
Dept: PULMONOLOGY | Age: 62
End: 2022-07-29

## 2022-07-29 NOTE — TELEPHONE ENCOUNTER
Received papers for patient for Short term disability,   I called and left message for patient informing her we will not be able to fill those papers out and it would have to come from her PCP.

## 2022-08-05 NOTE — TELEPHONE ENCOUNTER
Pt called back but did not need sent since not off longer, we received the same papers from [employer] so I faxed to them.

## 2022-08-08 ENCOUNTER — TELEPHONE (OUTPATIENT)
Dept: PULMONOLOGY | Age: 62
End: 2022-08-08

## 2022-08-08 NOTE — TELEPHONE ENCOUNTER
Return to work letter faxed to her employer [phone] Kiko Wisdom. She is still on O2 and can not work as long as she is on it. Her next f/u is 01/26/23.

## 2023-01-17 ENCOUNTER — TELEPHONE (OUTPATIENT)
Dept: PULMONOLOGY | Age: 63
End: 2023-01-17

## 2023-01-17 NOTE — TELEPHONE ENCOUNTER
Tyson called to give us a Pre Auth on this pt for a Low Dose CT at [place].  It is  [phone]

## 2023-01-19 ENCOUNTER — TELEPHONE (OUTPATIENT)
Dept: PULMONOLOGY | Age: 63
End: 2023-01-19

## 2023-01-19 NOTE — TELEPHONE ENCOUNTER
Received a call from [address] @  [facility] Ct Dept. Pt is scheduled to come in today for a CT Lung screen. The approval is in media, however patient has changed insurance per Kwabena Last. Kwabena Last asked the order is switched to Diagnostic Ct as she is 2 days too early for the [facility]. I advised the approval is through Carleen Pittman, if patient has switched insurance she will need a brand new auth. We do not have a copy of the patients new insurance, per Kwabena Last she will fax that to our office.

## 2023-01-24 DIAGNOSIS — J44.9 STAGE 3 SEVERE COPD BY GOLD CLASSIFICATION (HCC): ICD-10-CM

## 2023-01-24 DIAGNOSIS — Z87.891 PERSONAL HISTORY OF TOBACCO USE, PRESENTING HAZARDS TO HEALTH: ICD-10-CM

## 2023-02-07 NOTE — PROGRESS NOTES
[facility]  Pulmonary medicine clinic follow up note. Patient: Jose Riley  : 1960      Chief complaint/Ione: Jose Riley is a 58 y. o.oldfemale came for follow-up regarding her severe COPD after having low-dose CT scan of chest without IV contrast and spirometry before clinic visit. She is supposed to have repeat CT scan of chest without IV contrast to follow her lung nodule as recommended by radiologist who dictated the report. However, her medical insurance denied repeat CT scan in 6 months. Patient did not meet the criteria for medical insurance to have a repeat CT scan of chest.  Her case was closed. She was recommended to have a pulmonary rehab therapy for her severe COPD. Patient however not able to add in her pulmonary rehab therapy. Patient told me that she still working full-time and do not have time to attend her pulmonary rehab therapy. She was initially referred by Dr. Jayden Justice MD.     On today's questioning:  She denies cough or expectoration. She denies hemoptysis. She denies fever or chills. She denies recent hospitalizations or emergency room visits. She is using her prescribed inhalers with good compliance. She is using rescue inhaler/nebs rarely. She denies recent loss of weight or appetite changes. She denies recent decline in functional status. She is currently on following inhaler therapy:  -Trelegy Ellipta 100mcg/ 62.5mcg/25mcg per puff, 1puff daily in am  -Albuterol HFA 90mcg/Spray MDI, 2puffs  Q6Hprn. She  is currently on 3LPM of home O2 with exercise and 2LPM of home O2 at night time. However, she came to my clinic with out any oxygen supplementation. She denies any history of Glucoma or urinary retention in the past      Social History:  Occupation:  She is current working: No  Type of profession: She  retired in .  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 History of tobacco smoking:Yes. Amount of tobacco smokin.0 PPD. Years of tobacco smokin. Quit smoking: Yes. .              Quit year:   Current smoker: No.       Amount of current tobacco smokin PPD    History of recreational or IV drug use in the past:NO     History of exposure to coal mines/coal dust: NO  History of exposure to foundry dust/welding: NO  History of exposure to quarry/silica/sandblasting: NO  History of exposure to asbestos/working with breaks/ships: NO  History of exposure to farm dust: NO  History of recent travel to long distances: NO  History of exposure to birds, pigeons, or chickens in the past:NO  Pet animals at home:Yes  Dogs: 2  Cats: 0    History of pulmonary embolism in the past: No            History of DVT in the past:No                             Review of Systems:   General/Constitutional: she gained 10lbs of weight from the last visit. No fever or chills. HENT: Negative. Eyes: Negative. Upper respiratory tract: No nasal stuffiness or post nasal drip. Lower respiratory tract/ lungs: No cough or sputum production. Cardiovascular: No palpitations or chest pain. Gastrointestinal: No nausea or vomiting. Neurological: No focal neurologiacal weakness. Extremities: No edema. Musculoskeletal: No complaints. Genitourinary: No complaints. Hematological: Negative. Psychiatric/Behavioral: Negative. Skin: No itching. Current Medications:        No past medical history on file. No past surgical history on file. No Known Allergies    Current Outpatient Medications   Medication Sig Dispense Refill    escitalopram (LEXAPRO) 20 MG tablet Take 20 mg by mouth in the morning. buPROPion (WELLBUTRIN SR) 150 MG extended release tablet Take 150 mg by mouth in the morning and 150 mg before bedtime.       OXYGEN Inhale into the lungs 2L/02 at night and 3L/02 prn      fluticasone-umeclidin-vilant (TRELEGY ELLIPTA) 100-62.5-25 MCG/INH AEPB Inhale 1 puff into the lungs in the morning. 30 each 11    albuterol sulfate  (90 Base) MCG/ACT inhaler Inhale 2 puffs into the lungs every 6 hours as needed for Wheezing or Shortness of Breath 18 g 11     No current facility-administered medications for this visit. No family history on file. Physical Exam:    VITALS:  /72   Pulse 79   Temp 98 °F (36.7 °C)   Ht 5' 6\" (1.676 m)   Wt 159 lb (72.1 kg)   SpO2 94% Comment: r/a  BMI 25.66 kg/m²     Nursing note and vitals reviewed. Constitutional: Patient appears moderately built and moderately nourished. No distress. Patient is oriented to person, place, and time. HENT:   Head: Normocephalic and atraumatic. Right Ear: External ear normal.   Left Ear: External ear normal.   Mouth/Throat: Oropharynx is clear and moist.  No oral thrush. Eyes: Conjunctivae are normal. Pupils are equal, round, and reactive to light. No scleral icterus. Neck: Neck supple. No JVD present. No tracheal deviation present. Cardiovascular: Normal rate, regular rhythm, normal heart sounds. No murmur heard. Pulmonary/Chest: Effort normal and breath sounds normal. No stridor. No respiratory distress. No wheezes. No rales. Patient exhibits no tenderness. Abdominal: Soft. Patient exhibits no distension. No tenderness. Musculoskeletal: Normal range of motion. Extremities: Patient exhibits no edema and no tenderness. Lymphadenopathy:  No cervical adenopathy. Neurological: Patient is alert and oriented to person, place, and time. Skin: Skin is warm and dry. Patient is not diaphoretic. Psychiatric: Patient  has a normal mood and affect.  Patient behavior is normal.       Neck Circumference -   13.5  Mallampati - 4    Diagnostic Data:    Pulmonary function tests: Performed on 4 September 2018      She denies any history of Glucoma or urinary retention in the past.      Low dose CT Chest with out contrast for Lung Nodule Screenin22        The above radiological study films were reviewed by me. Six Minute Walk Test done on 22 at 8:54 AM VIVIAN Victor 1960    She needs no home O2 at rest. She needs 3 LPM of home O2 with exercise. She will be evaluated for nocturnal home O2 requirement- see separte order. Please see scanned six minute walk test document in media for details with above date. DME Medical Necessity Documentation    Patient was seen in my clinic on 22 for the diagnosis COPD. I am prescribing oxygen because the diagnosis and testing requires the patient to have oxygen in the home. Condition will improve or be benefited by oxygen use. The patient is  able to perform good mobility in a home setting and therefore does require the use of a portable oxygen system. Nocturnal pulse oximetry study performed on 2022 on room air:            CT chest with out contrast: Her medical insurance did not approve the CT scan of chest.  She did not meet the criteria for her medical insurance M/M    [id number] result:   Performed on 2022:  Normal pattern: M/M      Low-dose CT scan of chest without IV contrast: Low-dose CT scan of chest without IV contrast performed on 2023            Spirometry: Performed on 2023              Her FEV1 relatively stable from previous spirometry done in the past.      Assessment:  -Severe COPD- under control with the current inhaler therapy.  -Chronic use of tobacco smoking for 40 years with 1PPD. She quit smoking 2019  -Multiple calcified subcentimeter lung nodules-most likely due to previous granulomatous lung disease- Stable. -6 mm right lower lobe superior segment nodule noted on her CT scan of chest dated 2022- Stable.   -Chronic hypoxic respiratory failure on LT OT I.e 3LPM of home O2 with exercise and 2LPM of home O2 at night time  -History of scoliosis. Patient underwent surgery with subsequent metal rods placement in 1981. Recommendations/Plan:  -Continue Trelegy Ellipta 100mcg/ 62.5mcg/25mcg per puff, 1puff daily in am. Refills given to her local pharmacy.  -Continue patient on Albuterol HFA 90mcg/Spray MDI, 2puffs  Q6Hprn.   Anay Rivers advised to continue prescribed inhalers and keep good compliance.  -Patient educated to update her pneumococcal vaccine with family physician and take influenza vaccine in coming season with out fail.   -Anay Rivers needs no home O2 at rest. She needs 3LPM of home O2 with exercise and 2LPM of home O2 at night time.  -Schedule patient for low dose CT scan of chest with out IV contrast as recommended by the  U.S. Preventive Services Task Force and the NCCN for lung Cancer Screening and to follow 6 mm right lower lobe superior segment nodule noted on her low-dose CT scan of chest dated 20 January 2022 in January 2024. - Schedule patient for follow up with my clinic in January 2024 with recommended test I.e low-dose CT chest with out IV contrast and spirometry before clinic visit. Patient advised to make early appointment if needed. - Anay Rivers educated about my impression and plan. She verbalizes understanding.     -She was offered a Pulmonary rehab consult for pulmonary rehab therapy for her  COPD. How ever at the end of discussion she refused and verbalizes understanding of consequences of her decision. She also told me that she works 10 hours/day every day. She do not have any time to attend pulmonary rehab therapy    -I personally reviewed updated the Past medical hx, Past surgical hx,Social hx, Family hx, Medications, Allergies in the discrete data section of the patient chart along with labs, Pulmonary medicine,Sleep medicine related, Pathological, Microbiological and Radiological investigations.              Discussed with the patient the current USPSTF guidelines released March 9, 2021 for screening for lung cancer. For adults aged 48 to [de-identified] years who have a 20 pack-year smoking history and currently smoke or have quit within the past 15 years the grade B recommendation is to:  Screen for lung cancer with low-dose computed tomography (LDCT) every year. Stop screening once a person has not smoked for 15 years or has a health problem that limits life expectancy or the ability to have lung surgery. The patient  reports that she quit smoking about 3 years ago. Her smoking use included cigarettes. She has a 40.00 pack-year smoking history. She has never used smokeless tobacco.. Discussed with patient the risks and benefits of screening, including over-diagnosis, false positive rate, and total radiation exposure. The patient currently exhibits no signs or symptoms suggestive of lung cancer. Discussed with patient the importance of compliance with yearly annual lung cancer screenings and willingness to undergo diagnosis and treatment if screening scan is positive. In addition, the patient was counseled regarding the importance of remaining smoke free and/or total smoking cessation.     Also reviewed the following if the patient has Medicare that as of February 10, 2022, Medicare only covers LDCT screening in patients aged 51-72 with at least a 20 pack-year smoking history who currently smoke or have quit in the last 15 years

## 2023-03-01 ENCOUNTER — HOSPITAL ENCOUNTER (OUTPATIENT)
Dept: CT IMAGING | Age: 63
Discharge: HOME | End: 2023-03-01

## 2023-03-01 DIAGNOSIS — R52 PAIN: ICD-10-CM

## 2023-03-02 ENCOUNTER — OFFICE VISIT (OUTPATIENT)
Dept: PULMONOLOGY | Age: 63
End: 2023-03-02
Payer: COMMERCIAL

## 2023-03-02 VITALS
SYSTOLIC BLOOD PRESSURE: 123 MMHG | BODY MASS INDEX: 25.55 KG/M2 | TEMPERATURE: 98 F | OXYGEN SATURATION: 94 % | HEIGHT: 66 IN | WEIGHT: 159 LBS | HEART RATE: 79 BPM | DIASTOLIC BLOOD PRESSURE: 72 MMHG

## 2023-03-02 DIAGNOSIS — F17.210 SMOKING GREATER THAN 40 PACK YEARS: ICD-10-CM

## 2023-03-02 DIAGNOSIS — R91.1 INCIDENTAL LUNG NODULE, > 3MM AND < 8MM: ICD-10-CM

## 2023-03-02 DIAGNOSIS — J44.9 STAGE 3 SEVERE COPD BY GOLD CLASSIFICATION (HCC): Primary | ICD-10-CM

## 2023-03-02 DIAGNOSIS — J44.9 COPD, SEVERE (HCC): ICD-10-CM

## 2023-03-02 DIAGNOSIS — Z87.891 PERSONAL HISTORY OF TOBACCO USE, PRESENTING HAZARDS TO HEALTH: ICD-10-CM

## 2023-03-02 DIAGNOSIS — Z87.891 PERSONAL HISTORY OF TOBACCO USE: ICD-10-CM

## 2023-03-02 PROCEDURE — G0296 VISIT TO DETERM LDCT ELIG: HCPCS | Performed by: INTERNAL MEDICINE

## 2023-03-02 PROCEDURE — 99214 OFFICE O/P EST MOD 30 MIN: CPT | Performed by: INTERNAL MEDICINE

## 2023-03-02 RX ORDER — ALBUTEROL SULFATE 90 UG/1
2 AEROSOL, METERED RESPIRATORY (INHALATION) EVERY 6 HOURS PRN
Qty: 18 G | Refills: 11 | Status: SHIPPED | OUTPATIENT
Start: 2023-03-02 | End: 2024-03-01

## 2023-03-02 NOTE — PROGRESS NOTES
Neck Circumference -   13.5  Mallampati - 4    Lung Nodule Screening     [x] Qualifies    [] Does not qualify   [] Declined    [] Completed

## 2023-03-02 NOTE — PATIENT INSTRUCTIONS
Learning About Lung Cancer Screening  What is screening for lung cancer? Lung cancer screening is a way to find some lung cancers early, before a person has any symptoms of the cancer. Lung cancer screening may help those who have the highest risk for lung cancer--people age 48 and older who are or were heavy smokers. For most people, who aren't at increased risk, screening for lung cancer probably isn't helpful. Screening won't prevent cancer. And it may not find all lung cancers. Lung cancer screening may lower the risk of dying from lung cancer in a small number of people. How is it done? Lung cancer screening is done with a low-dose CT (computed tomography) scan. A CT scan uses X-rays, or radiation, to make detailed pictures of your body. Experts recommend that screening be done in medical centers that focus on finding and treating lung cancer. Who is screening recommended for? Lung cancer screening is recommended for people age 48 and older who are or were heavy smokers. That means people with a smoking history of at least 20 pack years. A pack year is a way to measure how heavy a smoker you are or were. To figure out your pack years, multiply how many packs a day on average (assuming 20 cigarettes per pack) you have smoked by how many years you have smoked. For example: If you smoked 1 pack a day for 20 years, that's 1 times 20. So you have a smoking history of 20 pack years. If you smoked 2 packs a day for 10 years, that's 2 times 10. So you have a smoking history of 20 pack years. Experts agree that screening is for people who have a high risk of lung cancer. But experts don't agree on what high risk means. Some say people age 48 or older with at least a 20-pack-year smoking history are high risk. Others say it's people age 54 or older with a 30-pack-year history. To see if you could benefit from screening, first find out if you are at high risk for lung cancer.  Your doctor can help you decide your lung cancer risk. What are the risks of screening? CT screening for lung cancer isn't perfect. It can show an abnormal result when it turns out there wasn't any cancer. This is called a false-positive result. This means you may need more tests to make sure you don't have cancer. These tests can be harmful and cause a lot of worry. These tests may include more CT scans and invasive testing like a lung biopsy. In a biopsy, the doctor takes a sample of tissue from inside your lung so it can be looked at under a microscope. A biopsy is the only way to tell if you have lung cancer. If the biopsy finds cancer, you and your doctor will have to decide how or whether to treat it. Some lung cancers found on CT scans are harmless and would not have caused a problem if they had not been found through screening. But because doctors can't tell which ones will turn out to be harmless, most will be treated. This means that you may get treatment--including surgery, radiation, or chemotherapy--that you don't need. There is a risk of damage to cells or tissue from being exposed to radiation, including the small amounts used in CTs, X-rays, and other medical tests. Over time, exposure to radiation may cause cancer and other health problems. But in most cases, the risk of getting cancer from being exposed to small amounts of radiation is low. It's not a reason to avoid these tests for most people. What are the benefits of screening? Your scan may be normal (negative). For some people who are at higher risk, screening lowers the chance of dying of lung cancer. How much and how long you smoked helps to determine your risk level. Screening can find some cancers early, when treatment may be more likely to work. What happens after screening? The results of your CT scan will be sent to your doctor. Someone from your care team will explain the results of your scan and answer any questions you may have.  If you need any follow-up, he or she will help you understand what to do next. After a lung cancer screening, you can go back to your usual activities right away. A lung cancer screening test can't tell if you have lung cancer. If your results are positive, your doctor can't tell whether an abnormal finding is a harmless nodule, cancer, or something else without doing more tests. What can you do to help prevent lung cancer? Some lung cancers can't be prevented. But if you smoke, quitting smoking is the best step you can take to prevent lung cancer. If you want to quit, your doctor can recommend medicines or other ways to help. Follow-up care is a key part of your treatment and safety. Be sure to make and go to all appointments, and call your doctor if you are having problems. It's also a good idea to know your test results and keep a list of the medicines you take. Where can you learn more? Go to http://www.wetzel.com/ and enter Q940 to learn more about \"Learning About Lung Cancer Screening. \"  Current as of: May 4, 2022               Content Version: 13.5  © 9221-7451 [employer]. Care instructions adapted under license by [facility] If you have questions about a medical condition or this instruction, always ask your healthcare professional. [address] any warranty or liability for your use of this information.     Recommendations/Plan:  -Continue Trelegy Ellipta 100mcg/ 62.5mcg/25mcg per puff, 1puff daily in am. Refills given to her local pharmacy.  -Continue patient on Albuterol HFA 90mcg/Spray MDI, 2puffs  Q6Hprn.   Terra Juarez advised to continue prescribed inhalers and keep good compliance.  -Patient educated to update her pneumococcal vaccine with family physician and take influenza vaccine in coming season with out fail.   -Terra Juarez needs no home O2 at rest. She needs 3LPM of home O2 with exercise and 2LPM of home O2 at night time.  -Schedule patient for low dose CT scan of chest with out IV contrast as recommended by the  U.S. Preventive Services Task Force and the NCCN for lung Cancer Screening and to follow 6 mm right lower lobe superior segment nodule noted on her low-dose CT scan of chest dated 20 January 2022 in January 2024. - Schedule patient for follow up with my clinic in January 2024 with recommended test I.e low-dose CT chest with out IV contrast and spirometry before clinic visit. Patient advised to make early appointment if needed. - Andrzej Goes educated about my impression and plan. She verbalizes understanding.

## 2023-07-14 ENCOUNTER — TELEPHONE (OUTPATIENT)
Dept: PULMONOLOGY | Age: 63
End: 2023-07-14

## 2023-07-14 NOTE — TELEPHONE ENCOUNTER
Patient left a v/m on 7/12/23 asking the office to call her back. . I returned patients call and left a message to call office back.

## 2023-07-17 DIAGNOSIS — J44.9 STAGE 3 SEVERE COPD BY GOLD CLASSIFICATION (HCC): ICD-10-CM

## 2023-07-17 DIAGNOSIS — R91.1 INCIDENTAL LUNG NODULE, > 3MM AND < 8MM: ICD-10-CM

## 2023-07-17 DIAGNOSIS — F17.210 SMOKING GREATER THAN 40 PACK YEARS: ICD-10-CM

## 2023-07-17 DIAGNOSIS — Z87.891 PERSONAL HISTORY OF TOBACCO USE, PRESENTING HAZARDS TO HEALTH: ICD-10-CM

## 2023-07-17 NOTE — TELEPHONE ENCOUNTER
Mckenzie Estrada called requesting a refill on the following medications:  Requested Prescriptions     Pending Prescriptions Disp Refills    fluticasone-umeclidin-vilant (TRELEGY ELLIPTA) 100-62.5-25 MCG/ACT AEPB inhaler 30 each 11     Sig: Inhale 1 puff into the lungs daily     Pharmacy verified: [id number] in [facility]  .       Date of last visit: 3/02/2023  Date of next visit (if applicable): Visit date not found

## 2024-01-16 ENCOUNTER — TELEPHONE (OUTPATIENT)
Dept: PULMONOLOGY | Age: 64
End: 2024-01-16

## 2024-01-16 NOTE — TELEPHONE ENCOUNTER
Started prior authorization request through [employer] for CT lung screen scheduled 2/6/24 at [place].    Received approval through [employer] (see media).  ID: [id number]  Valid through: 1/16/24-2/14/24

## 2024-02-06 DIAGNOSIS — Z87.891 PERSONAL HISTORY OF TOBACCO USE, PRESENTING HAZARDS TO HEALTH: ICD-10-CM

## 2024-02-06 DIAGNOSIS — R91.1 INCIDENTAL LUNG NODULE, > 3MM AND < 8MM: ICD-10-CM

## 2024-02-06 DIAGNOSIS — J44.9 STAGE 3 SEVERE COPD BY GOLD CLASSIFICATION (HCC): ICD-10-CM

## 2024-02-06 DIAGNOSIS — F17.210 SMOKING GREATER THAN 40 PACK YEARS: ICD-10-CM

## 2024-02-15 ENCOUNTER — HOSPITAL ENCOUNTER (OUTPATIENT)
Dept: CT IMAGING | Age: 64
Discharge: HOME | End: 2024-02-15

## 2024-02-15 ENCOUNTER — OFFICE VISIT (OUTPATIENT)
Dept: PULMONOLOGY | Age: 64
End: 2024-02-15
Payer: COMMERCIAL

## 2024-02-15 VITALS
BODY MASS INDEX: 26.29 KG/M2 | HEART RATE: 84 BPM | DIASTOLIC BLOOD PRESSURE: 70 MMHG | TEMPERATURE: 98.3 F | SYSTOLIC BLOOD PRESSURE: 126 MMHG | WEIGHT: 163.6 LBS | HEIGHT: 66 IN | OXYGEN SATURATION: 94 %

## 2024-02-15 DIAGNOSIS — J44.9 COPD, SEVERE (HCC): ICD-10-CM

## 2024-02-15 DIAGNOSIS — Z87.891 PERSONAL HISTORY OF TOBACCO USE, PRESENTING HAZARDS TO HEALTH: ICD-10-CM

## 2024-02-15 DIAGNOSIS — F17.210 SMOKING GREATER THAN 40 PACK YEARS: ICD-10-CM

## 2024-02-15 DIAGNOSIS — Z00.6 EXAMINATION FOR NORMAL COMPARISON FOR CLINICAL RESEARCH: ICD-10-CM

## 2024-02-15 DIAGNOSIS — R91.1 INCIDENTAL LUNG NODULE, > 3MM AND < 8MM: ICD-10-CM

## 2024-02-15 DIAGNOSIS — J44.9 STAGE 3 SEVERE COPD BY GOLD CLASSIFICATION (HCC): ICD-10-CM

## 2024-02-15 DIAGNOSIS — Z87.891 PERSONAL HISTORY OF TOBACCO USE: ICD-10-CM

## 2024-02-15 DIAGNOSIS — Z87.891 PERSONAL HISTORY OF TOBACCO USE: Primary | ICD-10-CM

## 2024-02-15 PROCEDURE — 99214 OFFICE O/P EST MOD 30 MIN: CPT | Performed by: INTERNAL MEDICINE

## 2024-02-15 PROCEDURE — G0296 VISIT TO DETERM LDCT ELIG: HCPCS | Performed by: INTERNAL MEDICINE

## 2024-02-15 RX ORDER — ALBUTEROL SULFATE 90 UG/1
2 AEROSOL, METERED RESPIRATORY (INHALATION) EVERY 6 HOURS PRN
Qty: 18 G | Refills: 11 | Status: SHIPPED | OUTPATIENT
Start: 2024-02-15 | End: 2025-02-14

## 2024-02-15 NOTE — PROGRESS NOTES
Neck Circumference -   14 inches  Mallampati - 4    Lung Nodule Screening     [] Qualifies    [] Does not qualify   [] Declined    [] Completed    
U.S. Preventive Services Task Force and the NCCN for lung Cancer Screening and to follow 6 mm right lower lobe superior segment nodule noted on her low-dose CT scan of chest dated 20 January 2022 in Feb 2025.   -Schedule patient for Spirometry before clinic visit with Bronchodilator response I.e 1year from now.  -She was advised to loose weight by controlling diet and doing exercise once cleared by her family physician.   - Schedule patient for follow up with my clinic in 1year with recommended test I.e low-dose CT chest with out IV contrast and spirometry before clinic visit. Patient advised to make early appointment if needed.  - Colleen Perez educated about my impression and plan.  She verbalizes understanding.       -She was offered a Pulmonary rehab consult for pulmonary rehab therapy for her  COPD. How ever at the end of discussion she refused and verbalizes understanding of consequences of her decision.    -I personally reviewed updated the Past medical hx, Past surgical hx,Social hx, Family hx, Medications, Allergies in the discrete data section of the patient chart along with labs, Pulmonary medicine,Sleep medicine related, Pathological, Microbiological and Radiological investigations.               Discussed with the patient the current USPSTF guidelines released March 9, 2021 for screening for lung cancer.    For adults aged 50 to 80 years who have a 20 pack-year smoking history and currently smoke or have quit within the past 15 years the grade B recommendation is to:  Screen for lung cancer with low-dose computed tomography (LDCT) every year.  Stop screening once a person has not smoked for 15 years or has a health problem that limits life expectancy or the ability to have lung surgery.    The patient  reports that she quit smoking about 4 years ago. Her smoking use included cigarettes. She started smoking about 44 years ago. She has a 40.0 pack-year smoking history. She has never used smokeless tobacco..

## 2024-02-15 NOTE — PATIENT INSTRUCTIONS
follow-up, he or she will help you understand what to do next.  After a lung cancer screening, you can go back to your usual activities right away.  A lung cancer screening test can't tell if you have lung cancer. If your results are positive, your doctor can't tell whether an abnormal finding is a harmless nodule, cancer, or something else without doing more tests.  What can you do to help prevent lung cancer?  Some lung cancers can't be prevented. But if you smoke, quitting smoking is the best step you can take to prevent lung cancer. If you want to quit, your doctor can recommend medicines or other ways to help.  Follow-up care is a key part of your treatment and safety. Be sure to make and go to all appointments, and call your doctor if you are having problems. It's also a good idea to know your test results and keep a list of the medicines you take.  Where can you learn more?  Go to https://www.Enigmedia.net/patientEd and enter Q940 to learn more about \"Learning About Lung Cancer Screening.\"  Current as of: February 28, 2023               Content Version: 13.9  © 4172-2699 [employer].   Care instructions adapted under license by [employer]. If you have questions about a medical condition or this instruction, always ask your healthcare professional. [employer] disclaims any warranty or liability for your use of this information.  Recommendations/Plan:  -Continue Trelegy Ellipta 100mcg/ 62.5mcg/25mcg per puff, 1puff daily in am. Refills given to her local pharmacy.  -Continue patient on Albuterol HFA 90mcg/Spray MDI, 2puffs  Q6Hprn. Refills given to local pharmacy.  -Colleen Perez advised to continue prescribed inhalers and keep good compliance.  -Patient educated to update her pneumococcal vaccine with family physician and take influenza vaccine in coming season with out fail.   -Colleen Perez needs no home O2 at rest. She needs 3LPM of home O2 with exercise and 2LPM of home O2 at

## 2024-03-01 DIAGNOSIS — J44.9 STAGE 3 SEVERE COPD BY GOLD CLASSIFICATION (HCC): ICD-10-CM

## 2024-03-01 DIAGNOSIS — R91.1 INCIDENTAL LUNG NODULE, > 3MM AND < 8MM: ICD-10-CM

## 2024-03-01 DIAGNOSIS — Z87.891 PERSONAL HISTORY OF TOBACCO USE, PRESENTING HAZARDS TO HEALTH: ICD-10-CM

## 2024-03-01 DIAGNOSIS — F17.210 SMOKING GREATER THAN 40 PACK YEARS: ICD-10-CM

## 2024-03-01 NOTE — TELEPHONE ENCOUNTER
Patient called in requesting a 90 day supply of Trelegy to [facility] in [place]. Please advise.   Thanks!

## 2025-01-22 NOTE — PROGRESS NOTES
[facility]  Pulmonary medicine clinic follow up note.      Patient: Colleen Perez  : 1960      Chief complaint/[place]amish: Colleen Perez is a 64 y.o.oldfemale came for follow-up regarding her severe COPD after having low-dose CT scan of chest without IV contrast and spirometry before clinic visit.    She is retired and currently staying at home.    She was initially referred by Dr. Kirill Ballard MD.     On today's questioning:  She denies cough or expectoration. She is using her oxygen as prescribed  She denies hemoptysis.  She denies fever or chills.   She denies recent hospitalizations or emergency room visits.     She is using her prescribed inhalers with good compliance.   She is using rescue inhaler/nebs rarely.     She denies recent loss of weight or appetite changes.   She denies recent decline in functional status.    She is currently on following inhaler therapy:  -Trelegy Ellipta 100mcg/ 62.5mcg/25mcg per puff, 1puff daily in am  -Albuterol HFA 90mcg/Spray MDI, 2puffs  Q6Hprn.     She  is currently on 3LPM of home O2 with exercise and 2LPM of home O2 at night time. However, she came to my clinic with out any oxygen supplementation.    She is currently doing chair exercises at home.    She did not want to go for pulm rehab therapy.    She denies any history of Glucoma or urinary retention in the past.    Social History:  Occupation:  She is current working: No  Type of profession: She  retired in . 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                     History of tobacco smoking:Yes.    Amount of tobacco smokin.0 PPD.  Years of tobacco smokin.                                    Quit smoking: Yes.  .              Quit year:   Current smoker: No.       Amount of current tobacco smokin PPD    History of recreational or IV drug use in the past:NO     History of

## 2025-02-06 ENCOUNTER — TELEPHONE (OUTPATIENT)
Dept: PULMONOLOGY | Age: 65
End: 2025-02-06

## 2025-02-06 NOTE — TELEPHONE ENCOUNTER
Attempted to start prior authorization request on [employer] portal for CT lung screen scheduled for 2/11/25 at [facility].    Authorization request was already submitted by Indu with [place] [employer] on 1/31/25 and approval received (see media).  Order ID: [id number]  Valid: 1/31/25-4/30/25

## 2025-02-06 NOTE — TELEPHONE ENCOUNTER
Spoke with Beatrice at [place] [employer] to verify authorization was received for patient's CT scheduled for 2/11/25.  Beatrice stated authorization was received and patient is good to go for testing.

## 2025-02-06 NOTE — TELEPHONE ENCOUNTER
Had Beatrice MAYERS work on patient's insurance on 1/31/25 to try and get it to verify.    Insurance now verified as of 2/4/25.

## 2025-02-13 DIAGNOSIS — Z87.891 PERSONAL HISTORY OF TOBACCO USE, PRESENTING HAZARDS TO HEALTH: ICD-10-CM

## 2025-02-13 DIAGNOSIS — J44.9 STAGE 3 SEVERE COPD BY GOLD CLASSIFICATION (HCC): ICD-10-CM

## 2025-02-13 DIAGNOSIS — F17.210 SMOKING GREATER THAN 40 PACK YEARS: ICD-10-CM

## 2025-02-17 DIAGNOSIS — Z87.891 PERSONAL HISTORY OF TOBACCO USE: ICD-10-CM

## 2025-02-20 ENCOUNTER — OFFICE VISIT (OUTPATIENT)
Dept: PULMONOLOGY | Age: 65
End: 2025-02-20
Payer: MEDICARE

## 2025-02-20 VITALS
SYSTOLIC BLOOD PRESSURE: 110 MMHG | HEART RATE: 71 BPM | DIASTOLIC BLOOD PRESSURE: 72 MMHG | HEIGHT: 66 IN | OXYGEN SATURATION: 92 % | TEMPERATURE: 98.1 F | WEIGHT: 157.8 LBS | BODY MASS INDEX: 25.36 KG/M2

## 2025-02-20 DIAGNOSIS — J44.9 STAGE 3 SEVERE COPD BY GOLD CLASSIFICATION (HCC): Primary | ICD-10-CM

## 2025-02-20 DIAGNOSIS — R91.1 INCIDENTAL LUNG NODULE, > 3MM AND < 8MM: ICD-10-CM

## 2025-02-20 DIAGNOSIS — J44.9 COPD, SEVERE (HCC): ICD-10-CM

## 2025-02-20 DIAGNOSIS — Z87.891 PERSONAL HISTORY OF TOBACCO USE: ICD-10-CM

## 2025-02-20 DIAGNOSIS — F17.210 SMOKING GREATER THAN 40 PACK YEARS: ICD-10-CM

## 2025-02-20 DIAGNOSIS — Z87.891 PERSONAL HISTORY OF TOBACCO USE, PRESENTING HAZARDS TO HEALTH: ICD-10-CM

## 2025-02-20 PROCEDURE — 99214 OFFICE O/P EST MOD 30 MIN: CPT | Performed by: INTERNAL MEDICINE

## 2025-02-20 PROCEDURE — G0296 VISIT TO DETERM LDCT ELIG: HCPCS | Performed by: INTERNAL MEDICINE

## 2025-02-20 RX ORDER — FLUOXETINE 20 MG/1
TABLET, FILM COATED ORAL
COMMUNITY
Start: 2024-12-30

## 2025-02-20 RX ORDER — ALBUTEROL SULFATE 90 UG/1
2 INHALANT RESPIRATORY (INHALATION) EVERY 6 HOURS PRN
Qty: 3 EACH | Refills: 3 | Status: SHIPPED | OUTPATIENT
Start: 2025-02-20 | End: 2026-02-20

## 2025-02-20 NOTE — PROGRESS NOTES
Neck Circumference -   13.75 inches  Mallampati - 4    Lung Nodule Screening     [] Qualifies    [] Does not qualify   [] Declined    [] Completed